# Patient Record
Sex: FEMALE | Race: WHITE | NOT HISPANIC OR LATINO | Employment: PART TIME
[De-identification: names, ages, dates, MRNs, and addresses within clinical notes are randomized per-mention and may not be internally consistent; named-entity substitution may affect disease eponyms.]

---

## 2017-03-02 ENCOUNTER — CHARTING TRANS (OUTPATIENT)
Dept: OTHER | Age: 52
End: 2017-03-02

## 2019-07-15 ENCOUNTER — HOSPITAL (OUTPATIENT)
Dept: OTHER | Age: 54
End: 2019-07-15

## 2019-07-16 LAB — PATHOLOGIST NAME: NORMAL

## 2019-09-11 ENCOUNTER — HOSPITAL (OUTPATIENT)
Dept: OTHER | Age: 54
End: 2019-09-11
Attending: INTERNAL MEDICINE

## 2021-10-20 ENCOUNTER — OFFICE VISIT (OUTPATIENT)
Dept: DERMATOLOGY | Age: 56
End: 2021-10-20

## 2021-10-20 DIAGNOSIS — L30.9 HAND DERMATITIS: Primary | ICD-10-CM

## 2021-10-20 PROCEDURE — 99203 OFFICE O/P NEW LOW 30 MIN: CPT | Performed by: DERMATOLOGY

## 2021-10-20 RX ORDER — AMLODIPINE BESYLATE 2.5 MG/1
2.5 TABLET ORAL DAILY
COMMUNITY
Start: 2021-09-22

## 2024-09-01 ENCOUNTER — HOSPITAL ENCOUNTER (OUTPATIENT)
Age: 59
Discharge: HOME | End: 2024-09-01
Payer: COMMERCIAL

## 2024-09-01 VITALS
SYSTOLIC BLOOD PRESSURE: 169 MMHG | BODY MASS INDEX: 26.46 KG/M2 | DIASTOLIC BLOOD PRESSURE: 91 MMHG | TEMPERATURE: 98 F | WEIGHT: 155 LBS | RESPIRATION RATE: 18 BRPM | OXYGEN SATURATION: 99 % | HEIGHT: 64 IN | HEART RATE: 60 BPM

## 2024-09-01 DIAGNOSIS — H57.89 EYE REDNESS: Primary | ICD-10-CM

## 2024-09-01 PROCEDURE — 99203 OFFICE O/P NEW LOW 30 MIN: CPT

## 2024-09-01 RX ORDER — TETRACAINE HYDROCHLORIDE 5 MG/ML
1 SOLUTION OPHTHALMIC ONCE
Status: COMPLETED | OUTPATIENT
Start: 2024-09-01 | End: 2024-09-01

## 2024-09-01 RX ORDER — OFLOXACIN 3 MG/ML
1 SOLUTION/ DROPS OPHTHALMIC 4 TIMES DAILY
Qty: 1 EACH | Refills: 0 | Status: SHIPPED | OUTPATIENT
Start: 2024-09-01 | End: 2024-09-08

## 2024-09-01 RX ORDER — AMLODIPINE BESYLATE 2.5 MG/1
5 TABLET ORAL DAILY
COMMUNITY
Start: 2024-07-02

## 2025-01-16 ENCOUNTER — OFFICE VISIT (OUTPATIENT)
Dept: FAMILY MEDICINE CLINIC | Facility: CLINIC | Age: 60
End: 2025-01-16
Payer: COMMERCIAL

## 2025-01-16 VITALS
SYSTOLIC BLOOD PRESSURE: 136 MMHG | DIASTOLIC BLOOD PRESSURE: 76 MMHG | OXYGEN SATURATION: 95 % | WEIGHT: 159 LBS | HEART RATE: 88 BPM | RESPIRATION RATE: 14 BRPM | TEMPERATURE: 98 F | BODY MASS INDEX: 27.14 KG/M2 | HEIGHT: 64 IN

## 2025-01-16 DIAGNOSIS — Z12.4 SCREENING FOR CERVICAL CANCER: ICD-10-CM

## 2025-01-16 DIAGNOSIS — M25.552 CHRONIC LEFT HIP PAIN: ICD-10-CM

## 2025-01-16 DIAGNOSIS — M25.561 CHRONIC PAIN OF BOTH KNEES: ICD-10-CM

## 2025-01-16 DIAGNOSIS — Z12.11 SCREENING FOR COLON CANCER: ICD-10-CM

## 2025-01-16 DIAGNOSIS — M25.562 CHRONIC PAIN OF BOTH KNEES: ICD-10-CM

## 2025-01-16 DIAGNOSIS — G89.29 CHRONIC PAIN OF BOTH KNEES: ICD-10-CM

## 2025-01-16 DIAGNOSIS — G89.29 CHRONIC LEFT HIP PAIN: ICD-10-CM

## 2025-01-16 DIAGNOSIS — Z00.00 ROUTINE GENERAL MEDICAL EXAMINATION AT A HEALTH CARE FACILITY: Primary | ICD-10-CM

## 2025-01-16 DIAGNOSIS — I10 PRIMARY HYPERTENSION: ICD-10-CM

## 2025-01-16 RX ORDER — AMLODIPINE BESYLATE 2.5 MG/1
5 TABLET ORAL DAILY
Qty: 180 TABLET | Refills: 3 | Status: SHIPPED | OUTPATIENT
Start: 2025-01-16

## 2025-01-16 NOTE — PROGRESS NOTES
HPI:    Josefina Zamora is a 59 year old female who presents for Physical (NP- establishing with you. Needs BP meds she is out. Req to see Ausra for paps will make appt. /The following individual(s) verbally consented to be recorded using ambient AI listening technology and understand that they can each withdraw their consent to this listening technology at any point by asking the clinician to turn off or pause the recording: /Reviewed Preventative/Wellness form with patient.//)     History of Present Illness  The patient, with a history of hypertension and colon polyps, presents for a routine check-up. She reports being generally healthy and is currently on amlodipine for blood pressure control. She takes two 2.5mg tablets of amlodipine daily, which she believes motivates her to work on weight reduction with the goal of reducing the medication dose. She has not checked her blood pressure recently and does not own a home blood pressure monitor.    The patient also reports a history of colon polyps and is due for a colonoscopy. She requests a referral for the procedure. Additionally, she requests a referral for a mammogram, as she has not had one recently.    The patient also reports experiencing pain in both knees and the left hip, which started when she turned [age]. She requests a referral to an orthopedist for further evaluation. She denies a confirmed diagnosis of arthritis.    The patient also mentions a recent episode of sharp eye pain, which she believes may have been caused by exposure to cleaning fumes. This episode led to an immediate care visit, during which she had elevated blood pressure.       Past History:   She  has a past medical history of Essential hypertension.   She  has a past surgical history that includes colonoscopy; d & c ();  (2002); other surgical history (); and cholecystectomy (2010).   Her family history includes Hypertension in her father.   She  reports current  alcohol use. She reports that she does not currently use drugs after having used the following drugs: Cannabis. No history on file for tobacco use.     She is not on any long-term medications.   She is allergic to cefadroxil and heparin.     Current Outpatient Medications on File Prior to Visit   Medication Sig    amLODIPine 2.5 MG Oral Tab Take 2 tablets (5 mg total) by mouth daily.     No current facility-administered medications on file prior to visit.         REVIEW OF SYSTEMS:   Patient denies shortness of breath, denies chest pain and denies any recent fevers or chills.    Patient reports no urinary complaints and denies headaches or visual disturbances.   Patient denies any abdominal pain at this time. Patient has no new skin lesions.  Patient reports no acute back pain and reports no dizziness or headaches.   Patient reports no visual disturbances and reports hearing has been about the same.   Patient reports no recent injury or trauma.               EXAM:    /76   Pulse 88   Temp 97.9 °F (36.6 °C)   Resp 14   Ht 5' 4\" (1.626 m)   Wt 159 lb (72.1 kg)   SpO2 95%   BMI 27.29 kg/m²  Estimated body mass index is 27.29 kg/m² as calculated from the following:    Height as of this encounter: 5' 4\" (1.626 m).    Weight as of this encounter: 159 lb (72.1 kg).    General Appearance:  Alert, cooperative, no distress, appears stated age   Head:  Normocephalic, without obvious abnormality, atraumatic   Eyes:  conjunctiva/cornea is not erythematous.        Nose: No nasal drainage.    Throat: No erythema    Neck: Supple, symmetrical, trachea midline, and normal ROM  thyroid: no obvious nodules   Back:   Symmetric, no curvature, ROM normal, no CVA tenderness   Lungs:   Clear to auscultation bilaterally, respirations unlabored   Chest Wall:  No tenderness or deformity   Heart:  Regular rate and rhythm, S1, S2 normal, no murmur,   Abdomen:   Soft, non-tender, bowel sounds active. No hernia.    Genitalia:      Rectal:     Extremities: Extremities normal, atraumatic, no cyanosis or edema   Pulses: 2+ and symmetric   Skin: Skin color, texture, turgor normal, no new rashes    Lymph nodes: No obvious cervical adenopathy.    Neurologic and psych: Normal speech, Alert and oriented x 3.   Normal mood, normal insight and judgment.                    ASSESSMENT AND PLAN:   1. Routine general medical examination at a health care facility    - [facility] SHELIA 2D+3D SCREENING BILAT (CPT=77067/98296); Future  - CBC With Differential With Platelet; Future  - Comp Metabolic Panel (14); Future  - Lipid Panel; Future  - TSH W Reflex To Free T4; Future    2. Screening for colon cancer  -due for c-scope, history of colon polyps.   - Gastro Referral - In Network    3. Primary hypertension  - 2.5 mg q daily when BP is at 130/80 and if over 140/90 should take 5 mg q daily.   - amLODIPine 2.5 MG Oral Tab; Take 2 tablets (5 mg total) by mouth daily.  Dispense: 180 tablet; Refill: 3    4. Screening for cervical cancer  -has seen GYn on 2/10/23    5. Chronic left hip pain  -referral to Dr. Kenney  - Ortho Referral - In Network    6. Chronic pain of both knees  -referral to Dr. Kenney.   - Ortho Referral - In Network     Assessment & Plan  Hypertension  On Amlodipine 2.5mg twice daily, but inconsistent dosing. Discussed the importance of consistent dosing and the potential to decrease dose with weight loss and lower blood pressure readings.  -Continue Amlodipine 2.5mg twice daily.  -Consider monitoring blood pressure at home or local pharmacy to assess need for dose adjustment.    Joint Pain  Complaints of pain in left hip and both knees. No confirmed diagnosis of arthritis.  -Referral to orthopedist for further evaluation and management.    General Health Maintenance  -Order mammogram.  -Referral for colonoscopy due in 2024 due to history of colon polyps.  -Plan to check routine labs after May 1, 2025.       Mina Phillips MD, 1/16/2025, 2:24 PM     Note to  patient: The 21st Century Cures Act makes medical notes like these available to patients in the interest of transparency. However, this is a medical document intended as peer to peer communication. It is written in medical language and may contain abbreviations or verbiage that are unfamiliar. It may appear blunt or direct. Medical documents are intended to carry relevant information, facts as evident, and the clinical opinion of the practitioner who signs the document.

## 2025-02-18 ENCOUNTER — TELEPHONE (OUTPATIENT)
Dept: ORTHOPEDICS CLINIC | Facility: CLINIC | Age: 60
End: 2025-02-18

## 2025-02-18 DIAGNOSIS — M25.552 PAIN OF LEFT HIP: ICD-10-CM

## 2025-02-18 DIAGNOSIS — M25.562 PAIN IN BOTH KNEES, UNSPECIFIED CHRONICITY: Primary | ICD-10-CM

## 2025-02-18 DIAGNOSIS — M25.561 PAIN IN BOTH KNEES, UNSPECIFIED CHRONICITY: Primary | ICD-10-CM

## 2025-02-18 NOTE — TELEPHONE ENCOUNTER
XRs ordered and scheduled per Ortho protocol.     Called and left message on voicemail to inform them of Xrs and asked them to arrive 15-20 minutes prior to appointment with Dr. Kenney

## 2025-02-20 ENCOUNTER — OFFICE VISIT (OUTPATIENT)
Facility: CLINIC | Age: 60
End: 2025-02-20
Payer: COMMERCIAL

## 2025-02-20 ENCOUNTER — HOSPITAL ENCOUNTER (OUTPATIENT)
Dept: GENERAL RADIOLOGY | Age: 60
Discharge: HOME | End: 2025-02-20
Attending: STUDENT IN AN ORGANIZED HEALTH CARE EDUCATION/TRAINING PROGRAM
Payer: COMMERCIAL

## 2025-02-20 VITALS — WEIGHT: 155 LBS | BODY MASS INDEX: 26.46 KG/M2 | HEIGHT: 64 IN

## 2025-02-20 DIAGNOSIS — M25.561 PAIN IN BOTH KNEES, UNSPECIFIED CHRONICITY: ICD-10-CM

## 2025-02-20 DIAGNOSIS — M25.562 PAIN IN BOTH KNEES, UNSPECIFIED CHRONICITY: ICD-10-CM

## 2025-02-20 DIAGNOSIS — M17.11 PRIMARY OSTEOARTHRITIS OF RIGHT KNEE: Primary | ICD-10-CM

## 2025-02-20 DIAGNOSIS — M25.552 PAIN OF LEFT HIP: ICD-10-CM

## 2025-02-20 DIAGNOSIS — M17.12 PRIMARY OSTEOARTHRITIS OF LEFT KNEE: ICD-10-CM

## 2025-02-20 DIAGNOSIS — M70.62 TROCHANTERIC BURSITIS OF LEFT HIP: ICD-10-CM

## 2025-02-20 PROCEDURE — 73564 X-RAY EXAM KNEE 4 OR MORE: CPT | Performed by: STUDENT IN AN ORGANIZED HEALTH CARE EDUCATION/TRAINING PROGRAM

## 2025-02-20 PROCEDURE — 99204 OFFICE O/P NEW MOD 45 MIN: CPT | Performed by: STUDENT IN AN ORGANIZED HEALTH CARE EDUCATION/TRAINING PROGRAM

## 2025-02-20 PROCEDURE — 73502 X-RAY EXAM HIP UNI 2-3 VIEWS: CPT | Performed by: STUDENT IN AN ORGANIZED HEALTH CARE EDUCATION/TRAINING PROGRAM

## 2025-02-20 PROCEDURE — 3008F BODY MASS INDEX DOCD: CPT | Performed by: STUDENT IN AN ORGANIZED HEALTH CARE EDUCATION/TRAINING PROGRAM

## 2025-02-20 NOTE — PROGRESS NOTES
Orthopaedic Surgery  [address] [address] [address]   [phone]     Chief Complaint:  Left Hip Pain, Bilateral Knee Pain    History of Present Illness:   Josefina Zamora is a 59 year old female seen in clinic today as new for evaluation of their left hip and bilateral knees. Hip pain has been present since 2024. Knee pain has been present for several years. Hip pain is located mostly at the lateral hip but sometimes can involve the posterior hip or groin. Knee pain is anterior, not medially or laterally based. It is described as aching. Symptoms are exacerbated by stairs. Hip pain can be worse with flexion and some exercises in yoga. Knee pain is made worse with kneeling. No swelling of the knee that they have noticed. No mechanical complaints. Their symptoms are made better by rest. No prior surgeries to the hip or either knee.    Prior imaging studies have included none.   Treatment so far has consisted of conservative management including NSAIDs at night as needed.    Assistive devices used: none      PMH/PSH/Family History/Social History/Meds/Allergies:   Past Medical History:    Essential hypertension       Past Surgical History:   Procedure Laterality Date    Cholecystectomy  2010    Colonoscopy      D & c  2001    Miscarried      2002    Other surgical history  1966    Corrective eye surgery       Family History   Problem Relation Age of Onset    Hypertension Father        Social History     Socioeconomic History    Marital status:      Spouse name: Not on file    Number of children: Not on file    Years of education: Not on file    Highest education level: Not on file   Occupational History    Not on file   Tobacco Use    Smoking status: Former     Current packs/day: 0.00     Average packs/day: 0.5 packs/day for 15.0 years (7.5 ttl pk-yrs)     Types: Cigarettes     Quit date: 2000     Years since quittin.1    Smokeless tobacco: Not on file   Vaping Use     Vaping status: Never Used   Substance and Sexual Activity    Alcohol use: Yes    Drug use: Never    Sexual activity: Not on file   Other Topics Concern    Caffeine Concern No    Exercise No    Seat Belt No    Special Diet No    Stress Concern No    Weight Concern Yes     Comment: Would like to loose weight   Social History Narrative    Not on file     Social Drivers of Health     Food Insecurity: No Food Insecurity (1/16/2025)    NCSS - Food Insecurity     Worried About Running Out of Food in the Last Year: No     Ran Out of Food in the Last Year: No   Transportation Needs: No Transportation Needs (1/16/2025)    NCSS - Transportation     Lack of Transportation: No   Stress: Not on file   Housing Stability: Not At Risk (1/16/2025)    NCSS - Housing/Utilities     Has Housing: Yes     Worried About Losing Housing: No     Unable to Get Utilities: No        Current Outpatient Medications   Medication Instructions    amLODIPine (NORVASC) 5 mg, Oral, Daily    amLODIPine (NORVASC) 5 mg, Oral, Daily       Allergies[1]      Physical Exam:   Vitals:    02/20/25 1040   Weight: 155 lb (70.3 kg)   Height: 5' 4\" (1.626 m)     Estimated body mass index is 26.61 kg/m² as calculated from the following:    Height as of this encounter: 5' 4\" (1.626 m).    Weight as of this encounter: 155 lb (70.3 kg).    Constitutional: No acute distress, well nourished.  Eyes: Anicteric sclera, pink conjunctiva  Ears, Nose, Mouth and Throat: Normocephalic, atraumatic, moist mucous membranes  Cardiovascular: No pitting edema or varicosities in the lower extremities  Respiratory: No respiratory distress, normal respiratory rhythm and effort   Neurological:  Oriented to person, place, and time.  Psychological:  Appropriate mood and affect.      Comprehensive Left Hip Exam:      Gait: Normal  Inspection: No obvious limb length discrepancy  Palpation: Trochanteric bursa; non-tender IT band  ROM: Flexion: 120 degrees  Internal Rotation: 20  degrees  External Rotation: 40 degrees  ROM causes pain?: Some lateral hip pain with rotation  Strength: Intact 5/5 strength with IP, Quadriceps, TA, GS, and EHL  Sensation: Grossly intact to light touch over SPN/DPN/Tibial/Sural nerve distributions  Special tests: Stinchfield: not painful; Trendelenberg: Negative  Vasc: Warm perfused extremity    Bilateral Knee Exam  Inspection: No erythema, ecchymoses, or wounds. No rash. No previous incisions noted. No effusion  Alignment: neutral  ROM: 0 - 130 degrees, flexion contracture: 0 degrees, quad lag: no  Stability: A/P stress: stable, firm endpoint, M/L stress: stable, firm endpoint  Pain or crepitus with ROM?: No. No pain with patellar grind  Non-tender at: medial joint line, lateral joint line, patella, patellar tendon, quadriceps tendon      Imaging:   XR AP Pelvis and 2 views AP and Lat of the Left Hip were personally reviewed and interpreted    There are mild degenerative changes of the hip with mild loss of joint space, subchondral sclerosis, and early osteophyte formation  No fracture or dislocation noted    Weightbearing XRs of the bilateral knee were obtained with AP, PA Flex, sunrise, and lateral views    They show mild to moderate degenerative changes of the knee, most pronounced in the medial and patellofemoral compartments with joint space narrowing and osteophyte formation, right greater than left.   No fracture or dislocation seen        Assessment:     ICD-10-CM    1. Primary osteoarthritis of right knee  M17.11       2. Primary osteoarthritis of left knee  M17.12       3. Trochanteric bursitis of left hip  M70.62              Plan:   At today's visit I discussed with the patient their diagnosis and the factors considered to form this diagnosis. Their history and physical exam and radiographic findings are consistent with arthritis of the knee and trochanteric bursa of the left hip. As we discussed their diagnosis, information was provided regarding the  underlying pathology.    During our discussion, we detailed treatment options available. Major joint arthritis is usually a chronic condition that can be effectively managed with conservative modalities. Trochanteric bursa will usually also improve with conservative management.    Conservative measures include activity modification, home exercise programs, physical therapy, oral anti-inflammatories and acetaminophen, and injections.     In consideration of prior treatments, I have recommended continued conservative treatment with Tylenol / NSAIDs and physical therapy.     Follow up if no improvement in 6-8 weeks and we can consider corticosteroid injections to the trochanteric bursa and/or knees.      Thank you very much for allowing me to participate in the care of this patient. If you have any questions, please do not hesitate to contact me.      Raymond Kenney MD  Adult Hip and Knee Reconstruction    Department of Orthopaedic Surgery  [facility]     [address] [place] [id number]  [address] [place] [id number]     t: [phone]  f: [phone]       [email or web address]         [1]   Allergies  Allergen Reactions    Cefadroxil HIVES and RASH    Heparin RASH

## 2025-02-21 ENCOUNTER — HOSPITAL ENCOUNTER (OUTPATIENT)
Dept: MAMMOGRAPHY | Age: 60
Discharge: HOME | End: 2025-02-21
Attending: FAMILY MEDICINE
Payer: COMMERCIAL

## 2025-02-21 DIAGNOSIS — Z00.00 ROUTINE GENERAL MEDICAL EXAMINATION AT A HEALTH CARE FACILITY: ICD-10-CM

## 2025-02-21 PROCEDURE — 77067 SCR MAMMO BI INCL CAD: CPT | Performed by: FAMILY MEDICINE

## 2025-02-21 PROCEDURE — 77063 BREAST TOMOSYNTHESIS BI: CPT | Performed by: FAMILY MEDICINE

## 2025-03-25 ENCOUNTER — TELEPHONE (OUTPATIENT)
Dept: PHYSICAL THERAPY | Facility: HOSPITAL | Age: 60
End: 2025-03-25

## 2025-03-26 ENCOUNTER — OFFICE VISIT (OUTPATIENT)
Facility: LOCATION | Age: 60
End: 2025-03-26
Attending: STUDENT IN AN ORGANIZED HEALTH CARE EDUCATION/TRAINING PROGRAM
Payer: COMMERCIAL

## 2025-03-26 DIAGNOSIS — M70.62 TROCHANTERIC BURSITIS OF LEFT HIP: ICD-10-CM

## 2025-03-26 DIAGNOSIS — M17.12 PRIMARY OSTEOARTHRITIS OF LEFT KNEE: ICD-10-CM

## 2025-03-26 DIAGNOSIS — M17.11 PRIMARY OSTEOARTHRITIS OF RIGHT KNEE: Primary | ICD-10-CM

## 2025-03-26 PROCEDURE — 97140 MANUAL THERAPY 1/> REGIONS: CPT

## 2025-03-26 PROCEDURE — 97110 THERAPEUTIC EXERCISES: CPT

## 2025-03-26 PROCEDURE — 97161 PT EVAL LOW COMPLEX 20 MIN: CPT

## 2025-03-26 NOTE — PROGRESS NOTES
LOWER EXTREMITY EVALUATION:     Diagnosis:   Primary osteoarthritis of right knee (M17.11)  Primary osteoarthritis of left knee (M17.12)  Trochanteric bursitis of left hip (M70.62) Patient:  Josefina Zamora (59 year old, female)        Referring Provider: Raymond Kenney  Today's Date   3/26/2025    Precautions:  None   Date of Evaluation: 25  Next MD visit: No data recorded  Date of Surgery: N/A     PATIENT SUMMARY   Summary of chief complaints: (L) hip/knee & (R) knee pain  History of current condition: (L) hip pain for about 9 months that has progressively worsened. (B) knee pain for many years. No previous therapy for the knees or hip in the past. Denies any major joint surgeries, denies any LE paresthesia, denies any significant back pain. (L) hip and bilateral knee pain with ascending/descending steps, squatting, kneeling, dressing/bathing, household activities, transitions. Has not used any ice or heat for relief. Pain in the (L) hip can be sharp and usually localized laterally and knee pain localized anteriorly. Denies any instability in the knees   Pain level: current 0 /10, at best 0 /10, at worst 7 /10  Description of symptoms: intermittent sharp lateral (L) hip pain (otherwise dull and achy); anterior knee pain   Occupation: [employer]    Leisure activities/Hobbies: walking 3-5 days/week (1-2 miles)   Prior level of function: no pain in the hip; painful and restricted with activity in regards to knees  Current limitations: walking long periods of time, standing, steps, squatting down, transitions, performing ADLs and household chores/activities  Pt goals: decrease pain, improve ability to perform daily activities without pain  Past medical history was reviewed with Josefina.   Imaging/Tests: see chart   Josefina  has a past medical history of Essential hypertension.  She  has a past surgical history that includes colonoscopy; d & c ();  (2002); other surgical history  (1966); and cholecystectomy (01/2010).    ASSESSMENT  Josefina presents to physical therapy evaluation with primary c/o (L) hip/knee & (R) knee pain. The results of the objective tests and measures show decreased & painful (L) hip ROM, decreased LE flexibility, decreased LE strength, gait impairment, hip mobility restrictions. Functional deficits include but are not limited to walking long periods of time, standing, steps, squatting down, transitions, performing ADLs and household chores/activities. Signs and symptoms are consistent with diagnosis of Primary osteoarthritis of right knee (M17.11)  Primary osteoarthritis of left knee (M17.12)  Trochanteric bursitis of left hip (M70.62). Pt and PT discussed evaluation findings, pathology, POC and HEP.  Pt voiced understanding and performs HEP correctly without reported pain. Skilled Physical Therapy is medically necessary to address the above impairments and reach functional goals.    OBJECTIVE:    Musculoskeletal  Observation: stands equal weight bearing, no obvious leg length discrepancy    Palpation: Moderate tenderness noted lateral quad, gluteal musculaure, TFL & greater trochanter. Myofascial restrictions and tightness noted throughout these structures      Special Tests: (-) scour, (-) FADIR    ROM and Strength: (* denotes performed with pain)  Hip   ROM MMT (-/5)    R L R L     Flex (L2) 110 degrees 105 degrees (painful) 5/5 4/5 (painful)     Ext      4-/5 3+/5 (painful)     Abd     4-/5 3+/5 (painful)     ER 30 degrees 25 degrees 5/5 4/5     IR 40 degrees 40 degrees 5/5 5/5    ,   Knee   ROM MMT (-/5)    R L R L     Flex 140 degrees 140 degrees 5/5 5/5     Ext (L3) 0 degrees 0 degrees 5/5 4/5 (painful)       Flexibility:  LE Flexibility R L     Hip Flexor min restricted mod restricted     Hamstrings mod restricted mod restricted     ITB min restricted mod restricted     Piriformis min restricted mod restricted     Quads min restricted mod restricted      Gastroc-soleus           Neurological:  Sensation: intact     Balance and Functional Mobility:  Gait: pt ambulates on level ground with mild antalgia, mild trendelenburg      Today's Treatment and Response:   Pt education was provided on exam findings, treatment diagnosis, treatment plan, expectations, and prognosis.  Today's Treatment       3/26/2025   LE Treatment   Therapeutic Exercise H/L HS stretch 30 sec x 3  H/L piriformis stretch 30 sec x 3 (knee to shoulder & knee down)  S/L clam 2 x 10    Manual Therapy STM/MFR (massage stick) (L) lateral quad, ITB, gluteal musculature    Therapeutic Exercise Minutes 10   Manual Therapy Minutes 10   Evaluation Minutes 25   Total Time Of Timed Procedures 20   Total Time Of Service-Based Procedures 25   Total Treatment Time 45   HEP H/L HS stretch 30 sec x 3  H/L piriformis stretch 30 sec x 3 (knee to shoulder & knee down)  S/L clam 2 x 10         Patient was instructed in and issued a HEP for:   H/L HS stretch 30 sec x 3  H/L piriformis stretch 30 sec x 3 (knee to shoulder & knee down)  S/L clam 2 x 10     Charges:  PT EVAL: Low Complexity, TherEx 1, Manual 1  In agreement with evaluation findings and clinical rationale, this evaluation involved LOW COMPLEXITY decision making due to no personal factors/comorbidities, 1-2 body structures involved/activity limitations, and stable symptoms as documented in the evaluation.                                                                                                                PLAN OF CARE:    Goals: (to be met in 10 visits)   Pt will report 50% reduction in both (L) hip & (B) knee pain rated at worst   Pt will be able to perform AROM (L) hip flexion to 110 degrees with minimal to no pain or restriction   Pt will demonstrate at least 4/5 LE strength in order to maximize effectiveness and efficiency with functional activities such as walking, steps, squatting down  Pt will verbalize being able to ascend/descend 6 & 8 inch  step with minimal to no (L) hip pain  Pt will verbalize being able to perform ADLs and household chores/tasks with minimal to no pain  Pt will be able to transition from sit to stand and get into and out of car with no pain or restrictions  Pt will be able to participate in most yoga exercises with minimal to no pain   Pt will perform HEP independently in order to sustain changes made with therapy sessions      Frequency / Duration: Patient will be seen 2x/week or a total of 10  visits over a 90 day period. Treatment will include: Gait training; Manual Therapy; Neuromuscular Re-education; Therapeutic Activities; Therapeutic Exercise; Home Exercise Program instruction    Education or treatment limitation: None   Rehab Potential: good     LEFS Score  LEFS Score: (Patient-Rptd) 71.25 % (3/23/2025  7:17 PM)      Patient/Family/Caregiver was advised of these findings, precautions, and treatment options and has agreed to actively participate in planning and for this course of care.    Thank you for your referral. Please co-sign or sign and return this letter via fax as soon as possible to [phone]. If you have any questions, please contact me at Dept: [phone]    Sincerely,  Electronically signed by therapist: Alan Robertson, PT  Physician's certification required: Yes  I certify the need for these services furnished under this plan of treatment and while under my care.    X___________________________________________________ Date____________________    Certification From: 3/26/2025  To:6/24/2025

## 2025-03-28 ENCOUNTER — HOSPITAL ENCOUNTER (OUTPATIENT)
Dept: MAMMOGRAPHY | Age: 60
Discharge: HOME | End: 2025-03-28
Attending: FAMILY MEDICINE
Payer: COMMERCIAL

## 2025-03-28 DIAGNOSIS — R92.2 INCONCLUSIVE MAMMOGRAM: ICD-10-CM

## 2025-03-28 PROCEDURE — 77061 BREAST TOMOSYNTHESIS UNI: CPT | Performed by: FAMILY MEDICINE

## 2025-03-28 PROCEDURE — 77065 DX MAMMO INCL CAD UNI: CPT | Performed by: FAMILY MEDICINE

## 2025-03-28 NOTE — IMAGING NOTE
This Breast Care RN assisted Dr. Stanley with recommendation for a left breast 1 site stereotactic biopsy for calcifications.  Procedure reviewed and all questions answered. Emotional and educational support given. On the day of the biopsy, pt instructed to take Tylenol 1000mg PO, eat a light meal & bring or wear a sports bra.  Post biopsy care also reviewed with pt to include NO lifting more than 5lbs, no exercising or housework (limit upper body movement) for 24-48 hrs post biopsy.  Patient denies blood thinners, bleeding disorders, liver disease, and chemo. Pt verbalized understanding.  Our breast center schedulers will be calling to schedule an appt that is convenient for pt.

## 2025-04-02 ENCOUNTER — OFFICE VISIT (OUTPATIENT)
Facility: LOCATION | Age: 60
End: 2025-04-02
Attending: STUDENT IN AN ORGANIZED HEALTH CARE EDUCATION/TRAINING PROGRAM
Payer: COMMERCIAL

## 2025-04-02 PROCEDURE — 97110 THERAPEUTIC EXERCISES: CPT

## 2025-04-02 PROCEDURE — 97140 MANUAL THERAPY 1/> REGIONS: CPT

## 2025-04-02 NOTE — PROGRESS NOTES
Patient: Josefina Zamora (59 year old, female) Referring Provider:  Insurance:   Diagnosis: Primary osteoarthritis of right knee (M17.11)  Primary osteoarthritis of left knee (M17.12)  Trochanteric bursitis of left hip (M70.62) Raymond Kenney  [employer]   Date of Surgery: N/A Next MD visit:  N/A   Precautions:  None No data recorded Referral Information:    Date of Evaluation: Req: 0, Auth: 0, Exp:     03/26/25 POC Auth Visits:  10       Today's Date   4/2/2025    Subjective  (L) hip feels better than last session.  worked on her thigh a little doing massage work. Knees a bit painful today. Both hip and knees hurt with ascending/descending stairs       Pain: 4/10     Objective  Myofascial restrictions (L) lateral quad; Moderate tenderness/guarding (L) TFL/ITB, gluteal musculature        Assessment  Demonstrates less palpable tenderness throughout (L) hip//knee musculature from the initial evaluation. Guarding and tightness noted mid to proximal lateral quad, TFL/ITB, posterolateral hip musculature. Less pain with ambulation post treatment. Tolerated exercises fairly well with only minimal discomfort    Goals (to be met in 10 visits)   Pt will report 50% reduction in both (L) hip & (B) knee pain rated at worst   Pt will be able to perform AROM (L) hip flexion to 110 degrees with minimal to no pain or restriction   Pt will demonstrate at least 4/5 LE strength in order to maximize effectiveness and efficiency with functional activities such as walking, steps, squatting down  Pt will verbalize being able to ascend/descend 6 & 8 inch step with minimal to no (L) hip pain  Pt will verbalize being able to perform ADLs and household chores/tasks with minimal to no pain  Pt will be able to transition from sit to stand and get into and out of car with no pain or restrictions  Pt will be able to participate in most yoga exercises with minimal to no pain   Pt will perform HEP independently in order to sustain changes made with  therapy sessions          Plan  Continue PT 2x/week to address (L) hip soft tissue restrictions, progress hip/knee strengthening & flexibility exercises. Attempt shuttle/leg press next visit    Treatment Last 4 Visits  Treatment Day: 2       3/26/2025 4/2/2025   LE Treatment   Therapeutic Exercise H/L HS stretch 30 sec x 3  H/L piriformis stretch 30 sec x 3 (knee to shoulder & knee down)  S/L clam 2 x 10  NuStep x 8 min (L4)   PT assisted (L) ITB stretch in supine 30 sec x 3  H/L HS stretch 30 sec x 3  H/L piriformis stretch 30 sec x 3 (knee to opposite shoulder & push knee down)  H/L ITB stretch 30 sec x 2  H/L clam GTB 2 x 15  Bridge 2 x 10 (5 second hold)   SB calf stretch 30 sec x 3  Standing ITB stretch 30 sec (had difficult time with finding stretch)     Manual Therapy STM/MFR (massage stick) (L) lateral quad, ITB, gluteal musculature  STM/massage stick to the (L) mid to proximal lateral quad, TFL/ITB, posterolateral hip musculature    Therapeutic Exercise Minutes 10 24   Manual Therapy Minutes 10 23   Evaluation Minutes 25    Total Time Of Timed Procedures 20 47   Total Time Of Service-Based Procedures 25 0   Total Treatment Time 45 47   HEP H/L HS stretch 30 sec x 3  H/L piriformis stretch 30 sec x 3 (knee to shoulder & knee down)  S/L clam 2 x 10          HEP  Access [email or web address]: [email or web address]  URL: [email or web address]  Date: 04/02/2025  Prepared by: Alan Robertson    Exercises  - Supine Bridge  - 1-2 x daily - 7 x weekly - 2 sets - 10 reps - 5 second hold  - Hooklying Clamshell with Resistance  - 1-2 x daily - 7 x weekly - 2 sets - 15 reps  - Clamshell  - 1-2 x daily - 7 x weekly - 2 sets - 10 reps  - Supine Hamstring Stretch  - 1-2 x daily - 7 x weekly - 3 sets - 30 seconds hold  - Supine Piriformis Stretch with Foot on Ground  - 1-2 x daily - 7 x weekly - 3 sets - 30 seconds hold  - Supine Figure 4 Piriformis Stretch  - 1 x daily - 7 x weekly - 3 sets - 30 seconds hold  - Supine ITB Stretch  -  1 x daily - 7 x weekly - 3 sets - 30 seconds hold  - ITB Stretch at Wall  - 1 x daily - 7 x weekly - 3 sets - 30 seconds hold    Charges     TherEx 2, Manual 2

## 2025-04-04 ENCOUNTER — OFFICE VISIT (OUTPATIENT)
Facility: LOCATION | Age: 60
End: 2025-04-04
Attending: STUDENT IN AN ORGANIZED HEALTH CARE EDUCATION/TRAINING PROGRAM
Payer: COMMERCIAL

## 2025-04-04 PROCEDURE — 97140 MANUAL THERAPY 1/> REGIONS: CPT

## 2025-04-04 PROCEDURE — 97110 THERAPEUTIC EXERCISES: CPT

## 2025-04-04 NOTE — PROGRESS NOTES
Patient: Josefina Zamora (59 year old, female) Referring Provider:  Insurance:   Diagnosis: Primary osteoarthritis of right knee (M17.11)  Primary osteoarthritis of left knee (M17.12)  Trochanteric bursitis of left hip (M70.62) Raymond Kenney  [employer]   Date of Surgery: N/A Next MD visit:  N/A   Precautions:  None No data recorded Referral Information:    Date of Evaluation: Req: 0, Auth: 0, Exp:     03/26/25 POC Auth Visits:  10       Today's Date   4/4/2025    Subjective  Hip is a bit sore after walking 1 mile with her son. Clam exercise on side is a bit painful as well. Does see some improvements since starting       Pain: 3/10     Objective  Moderate tenderness (L) TFL, quad, posterolateral hip musculature          Assessment  Tolerated treatment session well. Less irritability with transitions during session. Focused more on strengthening today as opposed to stretching. Discussed doing clam shell on back with resistance band instead of on side.    Goals (to be met in 10 visits)   Pt will report 50% reduction in both (L) hip & (B) knee pain rated at worst   Pt will be able to perform AROM (L) hip flexion to 110 degrees with minimal to no pain or restriction   Pt will demonstrate at least 4/5 LE strength in order to maximize effectiveness and efficiency with functional activities such as walking, steps, squatting down  Pt will verbalize being able to ascend/descend 6 & 8 inch step with minimal to no (L) hip pain  Pt will verbalize being able to perform ADLs and household chores/tasks with minimal to no pain  Pt will be able to transition from sit to stand and get into and out of car with no pain or restrictions  Pt will be able to participate in most yoga exercises with minimal to no pain   Pt will perform HEP independently in order to sustain changes made with therapy sessions              Plan  Continue PT 2x/week to work on improving muscular balance. Implement more strengthening exerises as tolerated    Treatment  Last 4 Visits  Treatment Day: 3       3/26/2025 4/2/2025 4/4/2025   LE Treatment   Therapeutic Exercise H/L HS stretch 30 sec x 3  H/L piriformis stretch 30 sec x 3 (knee to shoulder & knee down)  S/L clam 2 x 10  NuStep x 8 min (L4)   PT assisted (L) ITB stretch in supine 30 sec x 3  H/L HS stretch 30 sec x 3  H/L piriformis stretch 30 sec x 3 (knee to opposite shoulder & push knee down)  H/L ITB stretch 30 sec x 2  H/L clam GTB 2 x 15  Bridge 2 x 10 (5 second hold)   SB calf stretch 30 sec x 3  Standing ITB stretch 30 sec (had difficult time with finding stretch)   NuStep x 8 min (L6)  H/L HS stretch 30 sec x 3  H/L piriformis stretch 30 sec x 3  H/L clam GTB 2 x 15   Bridge 2 x 10 (5 second hold)   SB calf stretch 30 sec x 3  Shuttle (DL) 87# 2 x 15  HEP review    Manual Therapy STM/MFR (massage stick) (L) lateral quad, ITB, gluteal musculature  STM/massage stick to the (L) mid to proximal lateral quad, TFL/ITB, posterolateral hip musculature  STM/massage stick to lateral (R) quad, TFL/ITB, posterolateral hip musculature  STM/MFR (R) iliacus    Therapeutic Exercise Minutes 10 24 30   Manual Therapy Minutes 10 23 13   Evaluation Minutes 25     Total Time Of Timed Procedures 20 47 43   Total Time Of Service-Based Procedures 25 0 0   Total Treatment Time 45 47 43   HEP H/L HS stretch 30 sec x 3  H/L piriformis stretch 30 sec x 3 (knee to shoulder & knee down)  S/L clam 2 x 10           HEP  H/L HS stretch 30 sec x 3  H/L piriformis stretch 30 sec x 3 (knee to shoulder & knee down)  S/L clam 2 x 10     Charges     TherEx 2, Manual 1

## 2025-04-07 ENCOUNTER — TELEPHONE (OUTPATIENT)
Dept: PHYSICAL THERAPY | Facility: HOSPITAL | Age: 60
End: 2025-04-07

## 2025-04-09 NOTE — PROGRESS NOTES
Patient: Josefina Zamora (59 year old, female) Referring Provider:  Insurance:   Diagnosis: Primary osteoarthritis of right knee (M17.11)  Primary osteoarthritis of left knee (M17.12)  Trochanteric bursitis of left hip (M70.62) Raymond Kenney  [employer]   Date of Surgery: N/A Next MD visit:  N/A   Precautions:  None No data recorded Referral Information:    Date of Evaluation: Req: 0, Auth: 0, Exp:     03/26/25 POC Auth Visits:  10       Today's Date   4/9/2025    Subjective  Hip has been more sore lately. Has been sleeping on the (L) side which is irritating it.       Pain: 4/10     Objective  Moderate tightness & tenderness noted anterior/lateral/posterior (L) hip       Assessment  Previous to manual intervention, patient ambulated with moderate pain. Post intervention able to ambulate with moderate improvement in symptoms. Tightness noted anterior/lateral/posterior hip musculature. Tolerated treatment session well and generally felt better after session. Discussed avoiding laying on (L) side at night if possible and continue icing.    Goals (to be met in 10 visits)   Pt will report 50% reduction in both (L) hip & (B) knee pain rated at worst   Pt will be able to perform AROM (L) hip flexion to 110 degrees with minimal to no pain or restriction   Pt will demonstrate at least 4/5 LE strength in order to maximize effectiveness and efficiency with functional activities such as walking, steps, squatting down  Pt will verbalize being able to ascend/descend 6 & 8 inch step with minimal to no (L) hip pain  Pt will verbalize being able to perform ADLs and household chores/tasks with minimal to no pain  Pt will be able to transition from sit to stand and get into and out of car with no pain or restrictions  Pt will be able to participate in most yoga exercises with minimal to no pain   Pt will perform HEP independently in order to sustain changes made with therapy sessions              Plan  Continue PT 2x/week. Work on soft  tissue restrictions, gentle stretching, strengthening exercises    Treatment Last 4 Visits  Treatment Day: 4       3/26/2025 4/2/2025 4/4/2025 4/9/2025   LE Treatment   Therapeutic Exercise H/L HS stretch 30 sec x 3  H/L piriformis stretch 30 sec x 3 (knee to shoulder & knee down)  S/L clam 2 x 10  NuStep x 8 min (L4)   PT assisted (L) ITB stretch in supine 30 sec x 3  H/L HS stretch 30 sec x 3  H/L piriformis stretch 30 sec x 3 (knee to opposite shoulder & push knee down)  H/L ITB stretch 30 sec x 2  H/L clam GTB 2 x 15  Bridge 2 x 10 (5 second hold)   SB calf stretch 30 sec x 3  Standing ITB stretch 30 sec (had difficult time with finding stretch)   NuStep x 8 min (L6)  H/L HS stretch 30 sec x 3  H/L piriformis stretch 30 sec x 3  H/L clam GTB 2 x 15   Bridge 2 x 10 (5 second hold)   SB calf stretch 30 sec x 3  Shuttle (DL) 87# 2 x 15  HEP review  NuStep x 6 min   Prone RF stretch (PT assisted) 1 min x 2  H/L HS stretch 30 sec x 3  H/L piriformis stretch 30 sec x 3  Bridge 2 x 10 (5 second hold)  H/L clam GTB 2 x 15  SB calf stretch 30 sec x 3  Shuttle (DL) 100# 3 x 15   Manual Therapy STM/MFR (massage stick) (L) lateral quad, ITB, gluteal musculature  STM/massage stick to the (L) mid to proximal lateral quad, TFL/ITB, posterolateral hip musculature  STM/massage stick to lateral (R) quad, TFL/ITB, posterolateral hip musculature  STM/MFR (R) iliacus  STM/massage stick to (L) RF, lateral quad, ITB/TFL, posterior hip musculature    Therapeutic Exercise Minutes 10 24 30 30   Manual Therapy Minutes 10 23 13 15   Evaluation Minutes 25      Total Time Of Timed Procedures 20 47 43 45   Total Time Of Service-Based Procedures 25 0 0 0   Total Treatment Time 45 47 43 45               HEP  H/L HS stretch 30 sec x 3  H/L piriformis stretch 30 sec x 3 (knee to shoulder & knee down)  S/L clam 2 x 10     Charges     TherEx 2, Manual 2

## 2025-04-10 NOTE — DISCHARGE INSTRUCTIONS
Discharge Instructions  Stereotactic / Ultrasound / MRI Core Biopsy     Place an ice pack on top of the biopsy site in your bra OR the folds of the Ace wrap for 10-15 minutes every hour until bedtime for your comfort and to decrease bleeding.     Keep your supportive bra OR the Ace wrap in place for 24 hours after your biopsy. This compression decreases bleeding and breast movement for your comfort. Wear a supportive bra for the next couple days for comfort (as well as for sleeping)     No bath or shower during the first 24 hours after biopsy. After this time, you may take a bath or shower. It’s okay if the steri-strips get wet but don’t soak them.   No saunas, hot tubs, or swimming pools until the steri-strips fall off (5-7days).  This prevents infection and allows time for the area to completely close and heal.     DO NOT remove the steri-strips. They will fall off in 5 days to two weeks. If any type of irritation (redness, itching, OR blisters) develops in the area around the steri-strips, remove them gently. Keep the area clean and dry.     It is normal to have mild discomfort and bruising at the biopsy site.      You may take Tylenol as needed for discomfort.     DO NOT participate in strenuous activity (aerobics, heavy lifting, housework, gardening, etc.) 48 hours after your biopsy to prevent bleeding.     You will receive results typically within 2-3 business days. Occasionally, the specimen is sent off-site for a 2nd opinion. You will be notified when this occurs as this will delay your results.     If you have any questions about the procedure or your results, please contact the Breast Care Coordinator Nurse at [phone]. (M-F 7:30-4)    If you are having a MRI Breast Biopsy or an Ultrasound guided biopsy, you will be billed for the biopsy and a unilateral mammogram separately.    A 6-month or one year follow-up Diagnostic Mammogram/Ultrasound will be recommended to document stability of the biopsy  site. It may be scheduled at [facility] or [facility] by calling [phone]. You will need an order for this exam from your referring physician.       5/2024

## 2025-04-11 NOTE — TELEPHONE ENCOUNTER
Telephoned Josefina Zamora and name,  verified with patient. Notified Josefina Dean Zamora of left breast negative for malignancy but positive for ALH biopsy result. [facility]ance pending. Josefina Zamora reports biopsy site is healing well. Radiologist recommends surgical consultation. Called Dr Phillips's office and spoke to Dr Dupree. Patient is being referred to Dr Stern. Patient provided with the contact information for Dr Stern and instructed to call for a consultation appt. Pt verbalized understanding and had no further questions at this time.

## 2025-04-23 NOTE — PROGRESS NOTES
Patient: Josefina Zamora (59 year old, female) Referring Provider:  Insurance:   Diagnosis: Primary osteoarthritis of right knee (M17.11)  Primary osteoarthritis of left knee (M17.12)  Trochanteric bursitis of left hip (M70.62) Raymond Kenney  [employer]   Date of Surgery: N/A Next MD visit:  N/A   Precautions:  None No data recorded Referral Information:    Date of Evaluation: Req: 0, Auth: 0, Exp:     03/26/25 POC Auth Visits:  10       Today's Date   4/23/2025    Subjective  Less of the episodes of sharp intermittent pain. Still difficulty with steps and associated pain       Pain: 4/10     Objective  Tenderness/myofascial restrictions proximal to mid lateral quad. Mild to moderate piriformis tenderness        Assessment  Able to perform step up and down with much less pain for a few steps post soft tissue treatment. Tolerated table exercises well with no increases in pain. Generally felt much better after session. Discussed performing strength exercises to fatigue and longer stretches    Goals (to be met in 10 visits)   Pt will report 50% reduction in both (L) hip & (B) knee pain rated at worst   Pt will be able to perform AROM (L) hip flexion to 110 degrees with minimal to no pain or restriction   Pt will demonstrate at least 4/5 LE strength in order to maximize effectiveness and efficiency with functional activities such as walking, steps, squatting down  Pt will verbalize being able to ascend/descend 6 & 8 inch step with minimal to no (L) hip pain  Pt will verbalize being able to perform ADLs and household chores/tasks with minimal to no pain  Pt will be able to transition from sit to stand and get into and out of car with no pain or restrictions  Pt will be able to participate in most yoga exercises with minimal to no pain   Pt will perform HEP independently in order to sustain changes made with therapy sessions                Plan  Continue PT 2x/week to work on soft tissue restrictions, LE  strengthening, flexibility    Treatment Last 4 Visits  Treatment Day: 5       4/2/2025 4/4/2025 4/9/2025 4/23/2025   LE Treatment   Therapeutic Exercise NuStep x 8 min (L4)   PT assisted (L) ITB stretch in supine 30 sec x 3  H/L HS stretch 30 sec x 3  H/L piriformis stretch 30 sec x 3 (knee to opposite shoulder & push knee down)  H/L ITB stretch 30 sec x 2  H/L clam GTB 2 x 15  Bridge 2 x 10 (5 second hold)   SB calf stretch 30 sec x 3  Standing ITB stretch 30 sec (had difficult time with finding stretch)   NuStep x 8 min (L6)  H/L HS stretch 30 sec x 3  H/L piriformis stretch 30 sec x 3  H/L clam GTB 2 x 15   Bridge 2 x 10 (5 second hold)   SB calf stretch 30 sec x 3  Shuttle (DL) 87# 2 x 15  HEP review  NuStep x 6 min   Prone RF stretch (PT assisted) 1 min x 2  H/L HS stretch 30 sec x 3  H/L piriformis stretch 30 sec x 3  Bridge 2 x 10 (5 second hold)  H/L clam GTB 2 x 15  SB calf stretch 30 sec x 3  Shuttle (DL) 100# 3 x 15 Assessment & re-assessment 6 inch step up and down (post treatment)   H/L HS stretch 30 sec x 3  H/L piriformis stretch 30 sec x 2 (knee down & knee to opposite shoulder)   H/L clam GTB 2 x 20  Bridge w/GTB around knees 2 x 15  Seated clam GTB 2 x 15  Seated lumbar flexion stretch x 10 (5 second hold)    Shuttle (DL) 112# 3 x 15   Manual Therapy STM/massage stick to the (L) mid to proximal lateral quad, TFL/ITB, posterolateral hip musculature  STM/massage stick to lateral (R) quad, TFL/ITB, posterolateral hip musculature  STM/MFR (R) iliacus  STM/massage stick to (L) RF, lateral quad, ITB/TFL, posterior hip musculature  (L) hip flexor release  (L) piriformis release   STM/massage stick to lateral quad/ITB   Therapeutic Exercise Minutes 24 30 25 24   Manual Therapy Minutes 23 13 23 23   Total Time Of Timed Procedures 47 43 48 47   Total Time Of Service-Based Procedures 0 0 0 0   Total Treatment Time 47 43 48 47        HEP  H/L HS stretch 30 sec x 3  H/L piriformis stretch 30 sec x 3 (knee to  shoulder & knee down)  S/L clam 2 x 10     Charges     TherEx 2, Manual 2

## 2025-04-30 NOTE — PROGRESS NOTES
Patient: Josefina Zamora (59 year old, female) Referring Provider:  Insurance:   Diagnosis: Primary osteoarthritis of right knee (M17.11)  Primary osteoarthritis of left knee (M17.12)  Trochanteric bursitis of left hip (M70.62) No ref. provider found  [employer]   Date of Surgery: N/A Next MD visit:  N/A   Precautions:  None No data recorded Referral Information:    Date of Evaluation: Req: 0, Auth: 0, Exp:     03/26/25 POC Auth Visits:  10       Today's Date   4/30/2025    Subjective  Pt reports improvement in symptoms overall. Less intense \"grabbing\" sensations with activity. Step are better but still painful       Pain: 3/10     Objective  Mild to moderate palpable tenderness noted to proximal/lateral quad, ITB, posterolateral hip musculature        Assessment  Pt responds fairly well to manual treatment and exercise intervention with less pain upon end of session. We have focused on more table exercises but starting to transition to more standing activity. Able to tolerated side steps with light resistance with only mild discomfort. Encouraged continued self soft tissue work, strengthening exercises and icing intermittently    Goals (to be met in 10 visits)   Pt will report 50% reduction in both (L) hip & (B) knee pain rated at worst   Pt will be able to perform AROM (L) hip flexion to 110 degrees with minimal to no pain or restriction   Pt will demonstrate at least 4/5 LE strength in order to maximize effectiveness and efficiency with functional activities such as walking, steps, squatting down  Pt will verbalize being able to ascend/descend 6 & 8 inch step with minimal to no (L) hip pain  Pt will verbalize being able to perform ADLs and household chores/tasks with minimal to no pain  Pt will be able to transition from sit to stand and get into and out of car with no pain or restrictions  Pt will be able to participate in most yoga exercises with minimal to no pain   Pt will perform HEP independently  in order to sustain changes made with therapy sessions                  Plan  Continue PT 2x/week for additional 2 weeks to work on improving proximal hip strength & stability    Treatment Last 4 Visits  Treatment Day: 6       4/4/2025 4/9/2025 4/23/2025 4/30/2025   LE Treatment   Therapeutic Exercise NuStep x 8 min (L6)  H/L HS stretch 30 sec x 3  H/L piriformis stretch 30 sec x 3  H/L clam GTB 2 x 15   Bridge 2 x 10 (5 second hold)   SB calf stretch 30 sec x 3  Shuttle (DL) 87# 2 x 15  HEP review  NuStep x 6 min   Prone RF stretch (PT assisted) 1 min x 2  H/L HS stretch 30 sec x 3  H/L piriformis stretch 30 sec x 3  Bridge 2 x 10 (5 second hold)  H/L clam GTB 2 x 15  SB calf stretch 30 sec x 3  Shuttle (DL) 100# 3 x 15 Assessment & re-assessment 6 inch step up and down (post treatment)   H/L HS stretch 30 sec x 3  H/L piriformis stretch 30 sec x 2 (knee down & knee to opposite shoulder)   H/L clam GTB 2 x 20  Bridge w/GTB around knees 2 x 15  Seated clam GTB 2 x 15  Seated lumbar flexion stretch x 10 (5 second hold)    Shuttle (DL) 112# 3 x 15 Upright bike x 5 min (L3)   PPT x 20 ( 5 second hold)   H/L piriformis stretch 30 sec x 3  H/L clam BTB 2 x 15  Bridge w/BTB around knees 2 x 10 (5 second hold)   Seated lumbar flexion x 10 (5 second hold)   DL shuttle 137# 3 x 15  Side step RTB above knees x 2 laps    Manual Therapy STM/massage stick to lateral (R) quad, TFL/ITB, posterolateral hip musculature  STM/MFR (R) iliacus  STM/massage stick to (L) RF, lateral quad, ITB/TFL, posterior hip musculature  (L) hip flexor release  (L) piriformis release   STM/massage stick to lateral quad/ITB STM/MFR (massage stick & massage gun) proximal anterolateral thigh, posterolateral hip musculature    Therapeutic Exercise Minutes 30 25 24 25   Manual Therapy Minutes 13 23 23 23   Total Time Of Timed Procedures 43 48 47 48   Total Time Of Service-Based Procedures 0 0 0 0   Total Treatment Time 43 48 47 48        HEP  H/L HS stretch  30 sec x 3  H/L piriformis stretch 30 sec x 3 (knee to shoulder & knee down)  S/L clam 2 x 10     Charges     TherEx 2, Manual 2

## 2025-05-02 NOTE — PROGRESS NOTES
Patient: Josefina Zamora (59 year old, female) Referring Provider:  Insurance:   Diagnosis: Primary osteoarthritis of right knee (M17.11)  Primary osteoarthritis of left knee (M17.12)  Trochanteric bursitis of left hip (M70.62) No ref. provider found  BCBS POS   Date of Surgery: N/A Next MD visit:  N/A   Precautions:  None No data recorded Referral Information:    Date of Evaluation: Req: 0, Auth: 0, Exp:     03/26/25 POC Auth Visits:  10       Today's Date   5/2/2025    Subjective  Steps is still a challenge with discomfort (has been better since evaluation). Hip does not feel too bad today       Pain: 2/10     Objective  See flowsheet        Assessment  Post soft tissue treatment, patient was able to do side lying clam without pain. Table exercises are going fairly well. Trying to transition to more weightbearing exercises. Attempted step up/down does cause increase in lateral (L) hip pain. Encouraged her to perform gentle side stepping, modified tandem stance exercises at home    Goals (to be met in 10 visits)   Pt will report 50% reduction in both (L) hip & (B) knee pain rated at worst   Pt will be able to perform AROM (L) hip flexion to 110 degrees with minimal to no pain or restriction   Pt will demonstrate at least 4/5 LE strength in order to maximize effectiveness and efficiency with functional activities such as walking, steps, squatting down  Pt will verbalize being able to ascend/descend 6 & 8 inch step with minimal to no (L) hip pain  Pt will verbalize being able to perform ADLs and household chores/tasks with minimal to no pain  Pt will be able to transition from sit to stand and get into and out of car with no pain or restrictions  Pt will be able to participate in most yoga exercises with minimal to no pain   Pt will perform HEP independently in order to sustain changes made with therapy sessions                  Plan  Continue to attempt weight bearing exercise as tolerated    Treatment  Last 4 Visits  Treatment Day: 7       4/9/2025 4/23/2025 4/30/2025 5/2/2025   LE Treatment   Therapeutic Exercise NuStep x 6 min   Prone RF stretch (PT assisted) 1 min x 2  H/L HS stretch 30 sec x 3  H/L piriformis stretch 30 sec x 3  Bridge 2 x 10 (5 second hold)  H/L clam GTB 2 x 15  SB calf stretch 30 sec x 3  Shuttle (DL) 100# 3 x 15 Assessment & re-assessment 6 inch step up and down (post treatment)   H/L HS stretch 30 sec x 3  H/L piriformis stretch 30 sec x 2 (knee down & knee to opposite shoulder)   H/L clam GTB 2 x 20  Bridge w/GTB around knees 2 x 15  Seated clam GTB 2 x 15  Seated lumbar flexion stretch x 10 (5 second hold)    Shuttle (DL) 112# 3 x 15 Upright bike x 5 min (L3)   PPT x 20 ( 5 second hold)   H/L piriformis stretch 30 sec x 3  H/L clam BTB 2 x 15  Bridge w/BTB around knees 2 x 10 (5 second hold)   Seated lumbar flexion x 10 (5 second hold)   DL shuttle 137# 3 x 15  Side step RTB above knees x 2 laps  Upright bike x 6 min (L4)   S/L clam x 10   H/L clam BTB 2 x 15  Bridge w/BTB around knees  2 x 10   Seated piriformis stretch 30 sec x 3  Shuttle (DL) 137# 2 x 15  Shuttle (SL) 87# 2 x 15  Attempt 6 inch FSU/FSD x 4 (painful)  Modified tandem stance 30 sec x 2 (B)     Manual Therapy STM/massage stick to (L) RF, lateral quad, ITB/TFL, posterior hip musculature  (L) hip flexor release  (L) piriformis release   STM/massage stick to lateral quad/ITB STM/MFR (massage stick & massage gun) proximal anterolateral thigh, posterolateral hip musculature  STM/massage stick (L) anterolateral thigh musculature  Massage gun to posterolateral (L) hip musculature    Therapeutic Exercise Minutes 25 24 25 28   Manual Therapy Minutes 23 23 23 14   Total Time Of Timed Procedures 48 47 48 42   Total Time Of Service-Based Procedures 0 0 0 0   Total Treatment Time 48 47 48 42        HEP  H/L HS stretch 30 sec x 3  H/L piriformis stretch 30 sec x 3 (knee to shoulder & knee down)  S/L clam 2 x 10     Charges     TherEx  2, Manual 1

## 2025-05-07 NOTE — PROGRESS NOTES
Patient: Josefina Zamora (59 year old, female) Referring Provider:  Insurance:   Diagnosis: Primary osteoarthritis of right knee (M17.11)  Primary osteoarthritis of left knee (M17.12)  Trochanteric bursitis of left hip (M70.62) No ref. provider found  BC[employer] POS   Date of Surgery: N/A Next MD visit:  N/A   Precautions:  None No data recorded Referral Information:    Date of Evaluation: Req: 0, Auth: 0, Exp:     03/26/25 POC Auth Visits:  10       Today's Date   5/7/2025    Subjective  Feels like hip was doing well and now slightly worse. She is going to follow up with physician       Pain: 3/10     Objective  Moderate tenderness & increased tone to posterolateral hip musculature as well as lateral quad (proximal to mid). Moderate tenderness noted (L) GT          Assessment  Pt was able to perform table exercises without any discomfort. We have attempted to perform step ups/downs and side stepping which increase pain. She has attempted some side stepping at home but hip feels a bit worse today. Discussed holding off on those exercises and only perform ones that make the hip feel better. Encouraged icing and continued prescribed HEP    Goals (to be met in 10 visits)   Pt will report 50% reduction in both (L) hip & (B) knee pain rated at worst   Pt will be able to perform AROM (L) hip flexion to 110 degrees with minimal to no pain or restriction   Pt will demonstrate at least 4/5 LE strength in order to maximize effectiveness and efficiency with functional activities such as walking, steps, squatting down  Pt will verbalize being able to ascend/descend 6 & 8 inch step with minimal to no (L) hip pain  Pt will verbalize being able to perform ADLs and household chores/tasks with minimal to no pain  Pt will be able to transition from sit to stand and get into and out of car with no pain or restrictions  Pt will be able to participate in most yoga exercises with minimal to no pain   Pt will perform HEP  independently in order to sustain changes made with therapy sessions                    Plan  Continue PT x 1 more visit. Discuss potential discharge and follow up with physician as we seem to be reaching a plateau as well as unable to progress to weight bearing/standing activity    Treatment Last 4 Visits  Treatment Day: 8       4/23/2025 4/30/2025 5/2/2025 5/7/2025   LE Treatment   Therapeutic Exercise Assessment & re-assessment 6 inch step up and down (post treatment)   H/L HS stretch 30 sec x 3  H/L piriformis stretch 30 sec x 2 (knee down & knee to opposite shoulder)   H/L clam GTB 2 x 20  Bridge w/GTB around knees 2 x 15  Seated clam GTB 2 x 15  Seated lumbar flexion stretch x 10 (5 second hold)    Shuttle (DL) 112# 3 x 15 Upright bike x 5 min (L3)   PPT x 20 ( 5 second hold)   H/L piriformis stretch 30 sec x 3  H/L clam BTB 2 x 15  Bridge w/BTB around knees 2 x 10 (5 second hold)   Seated lumbar flexion x 10 (5 second hold)   DL shuttle 137# 3 x 15  Side step RTB above knees x 2 laps  Upright bike x 6 min (L4)   S/L clam x 10   H/L clam BTB 2 x 15  Bridge w/BTB around knees  2 x 10   Seated piriformis stretch 30 sec x 3  Shuttle (DL) 137# 2 x 15  Shuttle (SL) 87# 2 x 15  Attempt 6 inch FSU/FSD x 4 (painful)  Modified tandem stance 30 sec x 2 (B)   Upright bike x 6 min (L5)   H/L HS stretch 30 sec x 3  H/L piriformis stretch 30 sec x3  H/L clam BTB 2 x 20  Bridge BTB 2 x 10   LAQ 2# 2 x 15 (5 second hold)   Shuttle (DL) 137# 3 x 15       Manual Therapy (L) hip flexor release  (L) piriformis release   STM/massage stick to lateral quad/ITB STM/MFR (massage stick & massage gun) proximal anterolateral thigh, posterolateral hip musculature  STM/massage stick (L) anterolateral thigh musculature  Massage gun to posterolateral (L) hip musculature  STM (R) piriformis & proximal/lateral quad      Therapeutic Exercise Minutes 24 25 28 20   Manual Therapy Minutes 23 23 14 15   Total Time Of Timed Procedures 47 48 42 35    Total Time Of Service-Based Procedures 0 0 0 0   Total Treatment Time 47 48 42 35        HEP  H/L HS stretch 30 sec x 3  H/L piriformis stretch 30 sec x 3 (knee to shoulder & knee down)  S/L clam 2 x 10     Charges     TherEx 1, Manual 1

## 2025-05-07 NOTE — CONSULTS
Breast Surgery New Patient Consultation    Josefina Zamora is a 59 year old patient, referred by Dr. Phillips, who presents for evaluation of left breast ALH.    History of Present Illness:   Ms. Josefina Zamora is a 59 year old woman with a past history significant for hypertension who presents for evaluation of left breast ALH.     She underwent screening mammogram on 2025.  She has density C breast tissue.  There was a grouping of calcifications in the upper outer quadrant of the left breast.  Diagnostic imaging on 3/28/2025 redemonstrated the calcifications.  Biopsy was recommended and was performed on 4/10/2025, dumbbell clip was placed at the 1 o'clock position. The pathology showed focal atypical lobular hyperplasia with no evidence of malignancy.  This was concordant with the imaging.    She denies any palpable masses, nipple discharge, skin changes, or axillary adenopathy. She does not have breast pain. She does not have significant past history for breast diseases or breast biopsy. She does not have family history of breast cancer. The patient does not have a history of genetic testing.          Past Medical History[1]    Past Surgical History[2]    Gynecological History:  Menarche at age 10 and LMP age 52  Pt is a   Pt was 37 years old at time of first pregnancy.    She has cumulative breastfeeding history of 9 months.  Age of Menopause: 52  Type: natural  She denies any history of hormone replacement therapy  She has history of oral contraceptive use in the past.   She has recieved infertility treatment to achieve pregnancy.    Medications:    Current Medications[3]    Allergies:    Allergies[4]    Family History:   Family History[5]    She is not of Ashkenazi Christianity ancestry.    Social History:  History   Alcohol Use    Yes       History   Smoking Status    Former    Types: Cigarettes   Smokeless Tobacco    Not on file       Review of Systems:    Review of Systems    Constitutional:  Negative for chills and fever.   HENT:  Negative for sore throat and trouble swallowing.    Eyes:  Negative for visual disturbance.   Respiratory:  Negative for cough, chest tightness and shortness of breath.    Cardiovascular:  Negative for chest pain, palpitations and leg swelling.   Gastrointestinal:  Negative for nausea, vomiting, abdominal pain, diarrhea, constipation and blood in stool.   Genitourinary:  Negative for dysuria, hematuria and difficulty urinating.   Skin:  Negative for rash.   Neurological:  Negative for numbness and headaches.      Otherwise as per HPI.    Physical Exam:    /85   Pulse 91   Wt 71.7 kg (158 lb)   SpO2 98%   BMI 27.12 kg/m²     Physical Exam  Vitals reviewed.   Constitutional:       Appearance: Normal appearance.   HENT:      Head: Normocephalic and atraumatic.   Eyes:      Extraocular Movements: Extraocular movements intact.      Pupils: Pupils are equal, round, and reactive to light.   Cardiovascular:      Rate and Rhythm: Normal rate.   Pulmonary:      Effort: Pulmonary effort is normal.   Chest:   Breasts:     Right: Normal. No mass, nipple discharge, skin change or tenderness.      Left: Normal. No mass, nipple discharge, skin change or tenderness.       Abdominal:      General: Abdomen is flat.      Palpations: Abdomen is soft.   Musculoskeletal:         General: Normal range of motion.      Cervical back: Normal range of motion and neck supple.   Lymphadenopathy:      Upper Body:      Right upper body: No supraclavicular or axillary adenopathy.      Left upper body: No supraclavicular or axillary adenopathy.   Skin:     General: Skin is warm and dry.   Neurological:      General: No focal deficit present.      Mental Status: She is alert and oriented to person, place, and time.   Psychiatric:         Mood and Affect: Mood normal.          Bra size: 38DD (XL)    Labs/imaging: reviewed in EPIC    Impression:   Ms. Josefina Moran Courtneykillian Noah is a  59 year old woman that presents for left breast ALH    Discussion and Plan:  I had a discussion with the Patient regarding her breast exam. On exam today I found no evidence of disease. I personally reviewed her recent imaging and and we discussed this.    We discussed atypical lesions.  Excision is not always mandatory if pure ALH, and patients can be managed as high risk.  I discussed that data shows ALH has a 0 to 6% upstage rate.  I offered this patient both high risk management without surgery and the option of surgery. Observation would consist of physical exam with MRI and/or mammogram every 6 months for 1 year to assess for changes. I discussed that either way the patient would be referred for discussion of chemoprophylaxis. At this time I do recommend observation.     Atypical hyperplasia has an absolute risk of 1-2% per year risk of breast cancer. We will continue with a annual screening mammogram. We discussed MRI screening due to high risk of breast cancer and patient agrees. Clinical exam will be performed annually by myself and another provider (2 breast exams per year). We discussed risk reduction chemoprophylaxis and the patient will follow-up with medical oncology.      Further imaging:   Mammogram: Left breast diagnostic mammogram ordered for August 2025  Bilateral mammogram will be due February 2026, will order later    MRI: Bilateral breast MRI ordered for September 2025 for high risk screening in the setting of biopsy-proven ALH of the left breast    Return to clinic: 6 months    She was given ample opportunity for questions and those questions were answered to her satisfaction. She has been encouraged to contact the office with any questions or concerns. This encounter lasted a total of 55 minutes, more than 50% of which was dedicated to the discussion of management options.     Deidra Stern MD  Breast Surgical Oncology    CC: Dr. Phillips          [1]   Past Medical History:   Essential  hypertension   [2]   Past Surgical History:  Procedure Laterality Date    Cholecystectomy  2010    Colonoscopy      D & c      Miscarried      2002    Other surgical history  1966    Corrective eye surgery   [3]    amLODIPine 2.5 MG Oral Tab Take 2 tablets (5 mg total) by mouth daily. 180 tablet 3   [4]   Allergies  Allergen Reactions    Cefadroxil HIVES and RASH    Heparin RASH   [5]   Family History  Problem Relation Age of Onset    Hypertension Father

## 2025-05-07 NOTE — PATIENT INSTRUCTIONS
Mammogram ordered of left side for August 2025  Breast MRI ordered for September 2025  Return to clinic in 6 months  Referral to medical oncology for discussion of chemoprophylaxis (taking medicine to block hormones and decrease risk of breast cancer)

## 2025-05-09 NOTE — PROGRESS NOTES
Patient: Josefina Zamora (59 year old, female) Referring Provider:  Insurance:   Diagnosis: Primary osteoarthritis of right knee (M17.11)  Primary osteoarthritis of left knee (M17.12)  Trochanteric bursitis of left hip (M70.62) No ref. provider found  BCBS POS   Date of Surgery: N/A Next MD visit:  N/A   Precautions:  None No data recorded Referral Information:    Date of Evaluation: Req: 0, Auth: 0, Exp:     03/26/25 POC Auth Visits:  10       Today's Date   5/9/2025    Subjective  Feels much better today than the other day. Was able to descend the stairs the other day without any pain which was surprising       Pain: 1/10     Objective  See flowsheet        Assessment  Hypertonicity noted adductors, lateral quad, posterolateral gluteal musculature. Less tenderness and tightness post soft tissue treatment. Tolerating table exercises well however having difficulty with weightbearing activity. Any exercise that requires shifting weight on (L) leg causes increased pain. Difficult progressing    Goals (to be met in 10 visits)   Pt will report 50% reduction in both (L) hip & (B) knee pain rated at worst   Pt will be able to perform AROM (L) hip flexion to 110 degrees with minimal to no pain or restriction   Pt will demonstrate at least 4/5 LE strength in order to maximize effectiveness and efficiency with functional activities such as walking, steps, squatting down  Pt will verbalize being able to ascend/descend 6 & 8 inch step with minimal to no (L) hip pain  Pt will verbalize being able to perform ADLs and household chores/tasks with minimal to no pain  Pt will be able to transition from sit to stand and get into and out of car with no pain or restrictions  Pt will be able to participate in most yoga exercises with minimal to no pain   Pt will perform HEP independently in order to sustain changes made with therapy sessions                      Plan  Last session next week, discharge with HEP, follow up  with physician    Treatment Last 4 Visits  Treatment Day: 9 4/30/2025 5/2/2025 5/7/2025 5/9/2025   LE Treatment   Therapeutic Exercise Upright bike x 5 min (L3)   PPT x 20 ( 5 second hold)   H/L piriformis stretch 30 sec x 3  H/L clam BTB 2 x 15  Bridge w/BTB around knees 2 x 10 (5 second hold)   Seated lumbar flexion x 10 (5 second hold)   DL shuttle 137# 3 x 15  Side step RTB above knees x 2 laps  Upright bike x 6 min (L4)   S/L clam x 10   H/L clam BTB 2 x 15  Bridge w/BTB around knees  2 x 10   Seated piriformis stretch 30 sec x 3  Shuttle (DL) 137# 2 x 15  Shuttle (SL) 87# 2 x 15  Attempt 6 inch FSU/FSD x 4 (painful)  Modified tandem stance 30 sec x 2 (B)   Upright bike x 6 min (L5)   H/L HS stretch 30 sec x 3  H/L piriformis stretch 30 sec x3  H/L clam BTB 2 x 20  Bridge BTB 2 x 10   LAQ 2# 2 x 15 (5 second hold)   Shuttle (DL) 137# 3 x 15     Upright bike x 8 min (L5)  H/L clam BTB 2 x 20  S/L clam (painful)  PPT 2 x 10 (5 second hold)   Bridge w/BTB around knees 2 x 10   HS stretch 30 sec x 3  Seated piriformis stretch 30 sec x 3  Sit to stand x 20  Shuttle (DL) 150# 3 x 15   Shuttle (SL) 75# x 20     Manual Therapy STM/MFR (massage stick & massage gun) proximal anterolateral thigh, posterolateral hip musculature  STM/massage stick (L) anterolateral thigh musculature  Massage gun to posterolateral (L) hip musculature  STM (R) piriformis & proximal/lateral quad    STM/MFR (L) hip flexor, adductors, lateral quad    Therapeutic Exercise Minutes 25 28 20 30   Manual Therapy Minutes 23 14 15 12   Total Time Of Timed Procedures 48 42 35 42   Total Time Of Service-Based Procedures 0 0 0 0   Total Treatment Time 48 42 35 42        HEP  H/L HS stretch 30 sec x 3  H/L piriformis stretch 30 sec x 3 (knee to shoulder & knee down)  S/L clam 2 x 10     Charges     TherEx 2, Manual 1

## 2025-05-14 NOTE — TELEPHONE ENCOUNTER
Voicemail left for patient to call back to schedule with medical oncology for chemoprevention as referred by Dr. Stern. Direct number provided for patient to call back to schedule.

## 2025-05-14 NOTE — PROGRESS NOTES
Patient: Josefina Zamora (59 year old, female) Referring Provider:  Insurance:   Diagnosis: Primary osteoarthritis of right knee (M17.11)  Primary osteoarthritis of left knee (M17.12)  Trochanteric bursitis of left hip (M70.62) No ref. provider found  BCBS POS   Date of Surgery: N/A Next MD visit:  N/A   Precautions:  None No data recorded Referral Information:    Date of Evaluation: Req: 0, Auth: 0, Exp:     03/26/25 POC Auth Visits:  10       Today's Date   5/14/2025     Discharge Summary  Pt has attended 10 visits in Physical Therapy.        Subjective  Ready to go on a trip has been up and stairs alot which causes pain.       Pain: 2/10     Objective  AROM (R) hip WNL in all planes (mild pain with hip flexion, ER, IR)  Strength (MMT) - (L) hip flexion 4/5, (L) hip ER 4/5, (L) hip abduction 4-/5 (painful), (L) hip IR 4/5, (L) hip extension 4-/5  Flexibility - Mild (B) HS tightness, Mild to moderate (L) piriformis tightness, Mild to moderate quad tightness, Mild (L) hip flexor guarding.   Function - ambulation with non-antalgic gait pattern, slight increased internally rotated hip/leg, able to ascend/descend step with decreased motor control, strength, and pain noted        Assessment  Pt with mild to moderate improvement at times with sessions. Carryover has not been ideal outside of therapy as she continues to have pain in the hip. Steps and overall single leg stance activity seem to increased lateral hip pain. She tolerated table exercises well but difficult to progress to upright/weight bearing exercises.    Goals (to be met in 10 visits)   Pt will report 50% reduction in both (L) hip & (B) knee pain rated at worst - not met (in progress)  Pt will be able to perform AROM (L) hip flexion to 110 degrees with minimal to no pain or restriction - partially met  Pt will demonstrate at least 4/5 LE strength in order to maximize effectiveness and efficiency with functional activities such as walking,  steps, squatting down - not met (in progress)  Pt will verbalize being able to ascend/descend 6 & 8 inch step with minimal to no (L) hip pain - not met  Pt will verbalize being able to perform ADLs and household chores/tasks with minimal to no pain - partially met  Pt will be able to transition from sit to stand and get into and out of car with no pain or restrictions - partially met  Pt will be able to participate in most yoga exercises with minimal to no pain - not met (in progress)  Pt will perform HEP independently in order to sustain changes made with therapy sessions - met         Plan  Follow up with referring physician. Discharge with HEP    LEFS Score  LEFS Score: (Patient-Rptd) 71.25 % (3/23/2025  7:17 PM)    Post LEFS Score  Post LEFS Score: (Patient-Rptd) 71.25 % (5/14/2025 12:37 PM)    0 % improvement        Treatment Last 4 Visits  Treatment Day: 10       5/2/2025 5/7/2025 5/9/2025 5/14/2025   LE Treatment   Therapeutic Exercise Upright bike x 6 min (L4)   S/L clam x 10   H/L clam BTB 2 x 15  Bridge w/BTB around knees  2 x 10   Seated piriformis stretch 30 sec x 3  Shuttle (DL) 137# 2 x 15  Shuttle (SL) 87# 2 x 15  Attempt 6 inch FSU/FSD x 4 (painful)  Modified tandem stance 30 sec x 2 (B)   Upright bike x 6 min (L5)   H/L HS stretch 30 sec x 3  H/L piriformis stretch 30 sec x3  H/L clam BTB 2 x 20  Bridge BTB 2 x 10   LAQ 2# 2 x 15 (5 second hold)   Shuttle (DL) 137# 3 x 15     Upright bike x 8 min (L5)  H/L clam BTB 2 x 20  S/L clam (painful)  PPT 2 x 10 (5 second hold)   Bridge w/BTB around knees 2 x 10   HS stretch 30 sec x 3  Seated piriformis stretch 30 sec x 3  Sit to stand x 20  Shuttle (DL) 150# 3 x 15   Shuttle (SL) 75# x 20   HEP review, updated, handout provided   H/L clam BTB 2 x 20  Bridge w/BTB 2 x 10   PPT x 15 (5 second hold)   Attempted side lying hip abduction (painful)       Manual Therapy STM/massage stick (L) anterolateral thigh musculature  Massage gun to posterolateral (L) hip  musculature  STM (R) piriformis & proximal/lateral quad    STM/MFR (L) hip flexor, adductors, lateral quad  STM/MFR (L) hip flexor (iliacus)   STM/MFR (L) piriformis    Therapeutic Exercise Minutes 28 20 30 15   Manual Therapy Minutes 14 15 12 15   Total Time Of Timed Procedures 42 35 42 30   Total Time Of Service-Based Procedures 0 0 0 0   Total Treatment Time 42 35 42 30               HEP  Access Code: [email or web address]  URL: [email or web address]  Date: 05/14/2025  Prepared by: Alan Robertson    Exercises  - Hooklying Clamshell with Resistance  - 2 x daily - 7 x weekly - 2 sets - 15 reps  - Clamshell  - 1-2 x daily - 7 x weekly - 3 sets - 10 reps  - Supine Bridge with Resistance Band  - 2 x daily - 7 x weekly - 2 sets - 10 reps - 5 second hold  - Supine Posterior Pelvic Tilt  - 2 x daily - 7 x weekly - 2 sets - 10 reps - 5 second hold  - Supine Piriformis Stretch with Foot on Ground  - 2 x daily - 7 x weekly - 3 sets - 30 seconds hold  - Supine Hamstring Stretch  - 2 x daily - 7 x weekly - 3 sets - 30 seconds hold    Charges     TherEx 1, Manual 1

## 2025-05-19 NOTE — TELEPHONE ENCOUNTER
Called patient back regarding her VM about needing assistance with scheduling medical oncologist for chemoprevention, as recommended by Dr. Stern. Scheduled patient at the [facility] with Dr. Coats on Thursday July 24, 2025 at 1300. Provided the breast RN navigators contact information for further assistance.

## 2025-07-24 NOTE — CONSULTS
[facility] Report of Consultation    Patient Name: Josefina Zamora   YOB: 1965   Medical Record Number: [id number]   CSN: [id number]   Consulting Physician: Lalit Coats MD  Referring Physician(s): No ref. provider found  Date of Consultation: 7/24/2025     Reason for Consultation:  Josefina Zam[facility] for evaluation and management of atypical lobular hyperplasia of the left breast.    History of Present Illness  Josefina Zamora is a 60 year old female who presents for follow-up after a biopsy showing atypical lobular hyperplasia. She was referred by Dr. Stenr for further evaluation.    She underwent a routine screening mammogram on February 21, 2025, which revealed a group of calcifications in the upper outer left breast. Additional compression views on March 28, 2025, confirmed these calcifications, leading to a recommendation for a biopsy. A biopsy performed on April 10, 2025, showed atypical lobular hyperplasia, usual duct hyperplasia, sclerosing adenosis, and stromal fibrosis with microcalcifications. No evidence of malignancy was found.    Her physical health has been stable over the past year with no new pains, breathing problems, abdominal issues, bowel issues, or urinary issues. She is on blood pressure medication.    Her past medical history includes corrective eye surgery and a D&C in 2001 after a miscarriage. She has experienced several miscarriages, with the first occurring at twelve weeks. She has no personal history of venous thrombosis.    Her family history is significant for a grandfather with colon cancer on her mother's side and her mother having experienced blood clots in recent years.    She has a history of smoking but quit around the year 2000. She consumes alcohol regularly, typically a daily cocktail.    Past Medical History:  Past Medical History[1]    Past Surgical History:  Past Surgical  History[2]    Family Medical History:  Family History[3]    Gyne History:  OB History    Para Term  AB Living   1 1 0 0 0 0   SAB IAB Ectopic Multiple Live Births   0 0 0 0 0   Obstetric Comments   Menarche at age 10   Age of first pregnancy 37   Menopause at about 52       Psychosocial History:  Social History     Socioeconomic History    Marital status:      Spouse name: Not on file    Number of children: 1    Years of education: Not on file    Highest education level: Not on file   Occupational History    Not on file   Tobacco Use    Smoking status: Former     Current packs/day: 0.00     Average packs/day: 0.5 packs/day for 15.0 years (7.5 ttl pk-yrs)     Types: Cigarettes     Quit date: 2000     Years since quittin.5    Smokeless tobacco: Not on file   Vaping Use    Vaping status: Never Used   Substance and Sexual Activity    Alcohol use: Yes    Drug use: Never    Sexual activity: Not on file   Other Topics Concern    Caffeine Concern No    Exercise No    Seat Belt No    Special Diet No    Stress Concern No    Weight Concern Yes     Comment: Would like to loose weight   Social History Narrative    Not on file     Social Drivers of Health     Food Insecurity: No Food Insecurity (2025)    NCSS - Food Insecurity     Worried About Running Out of Food in the Last Year: No     Ran Out of Food in the Last Year: No   Transportation Needs: No Transportation Needs (2025)    NCSS - Transportation     Lack of Transportation: No   Housing Stability: Not At Risk (2025)    NCSS - Housing/Utilities     Has Housing: Yes     Worried About Losing Housing: No     Unable to Get Utilities: No       Allergies:   Allergies[4]    Current Medications:  Medications - Current[5]    Review of Systems:      Constitutional: Negative for anorexia, fatigue, fevers, chills, night sweats and weight loss.  Eyes: Negative for visual disturbance, irritation and redness.  Respiratory: Negative for cough,  hemoptysis, chest pain, or dyspnea.  Cardiovascular: Negative for angina, orthopnea or palpitations.  Gastrointestinal: Negative for nausea, vomiting, change in bowel habits, diarrhea, constipation and abdominal pain.  Integument/breast: Negative for rash, skin lesions, and pruritus.  Hematologic/lymphatic: Negative for easy bruising, bleeding, and lymphadenopathy.  Musculoskeletal: Negative for myalgias, arthralgias, muscle weakness.  Genitourinary: Negative for dysuria or hematuria  Neurological: Negative for headaches, dizziness, speech problems, gait problems and focal weakness.  Psychiatric: The patient's mood was calm and appropriate for this visit.    The pertinent positives and negatives were described in the HPI and above. All other systems were negative.      Vital Signs:  Height: 160 cm (5' 3\") (07/24 1254)  Weight: 71 kg (156 lb 8 oz) (07/24 1254)  BSA (Calculated - sq m): 1.74 sq meters (07/24 1254)  Pulse: 74 (07/24 1254)  BP: 154/92 (07/24 1254)  Temp: 98.6 °F (37 °C) (07/24 1254)  Do Not Use - Resp Rate: --  SpO2: 97 % (07/24 1254)    Physical Examination:    Constitutional: Patient is alert and oriented x 3, not in acute distress.  HEENT:  Oropharynx is clear. Neck is supple.  Eyes: Anicteric sclera. Pink conjunctiva.  Respiratory: Clear to auscultation and percussion. No rales.  No wheezes.  Cardiovascular: Regular rate and rhythm.   Gastrointestinal: Soft, non tender with good bowel sounds.  Extremities: No edema. No calf tenderness.  Lymphatics: There is no palpable lymphadenopathy throughout in the cervical, supraclavicular, or axillary regions.    Labs reviewed at this visit:  No results found for: \"WBC\", \"RBC\", \"HGB\", \"HCT\", \"MCV\", \"MCH\", \"MCHC\", \"RDW\", \"PLT\", \"MPV\"  No results found for: \"NA\", \"K\", \"CL\", \"CO2\", \"VHCO3\", \"BUN\", \"CREATSERUM\", \"GLU\", \"CA\", \"ALKPHO\", \"ALT\", \"AST\", \"BILT\", \"ALB\", \"TP\"    Results  RADIOLOGY  Mammogram: Group of calcifications in the upper outer left breast  (02/21/2025)  Compression views: Group of calcifications in the left breast (03/28/2025)    PATHOLOGY  Breast biopsy: Focal area of atypical lobular hyperplasia, usual ductal hyperplasia, sclerosing adenosis, stromal fibrosis with microcalcifications, no evidence of malignancy (04/10/2025)     Radiologic imaging reviewed at this visit:    Mammogram on 3/28/2025:  FINDINGS:    Grouping of calcifications within the 1 o'clock position of the left breast which stereotactic biopsy is recommended to further evaluate.     The findings from the diagnostic imaging and recommendation of stereotactic biopsy of the left breast were discussed with the patient. I also discussed the risks/benefits of the procedure and she was amenable to proceed with the biopsy.     Impression   CONCLUSION:    Grouping of calcifications within the left breast which stereotactic biopsy is recommended.     BI-RADS CATEGORY:    DIAGNOSTIC [id number] - SUSPICIOUS (MODERATE SUSPICION FOR MALIGNANCY)       RECOMMENDATIONS:    STEREOTACTIC BREAST BIOPSY: LEFT BREAST           Assessment & Plan  Atypical lobular hyperplasia  Atypical lobular hyperplasia identified in the left breast following biopsy with no evidence of malignancy. Increased risk of developing breast cancer in both breasts, approximately three to fourfold higher than average.  - Continue regular screening with mammograms and MRI as recommended by Dr. Stern.  - Consider raloxifene for breast cancer risk reduction due to its balance of benefits and risks, particularly in postmenopausal women.  - Discussed medication options, including tamoxifen, raloxifene, and anastrozole:  I discussed benefits and risks of these options. I discussed the risk of thrombosis with tamoxifen and raloxifene. I discussed the risk of uterine cancer. I discussed the risk of arthralgias and decreased bone density on anastrozole. The patient will discuss these options with her family and decide on a plan. She said  she will contact me with a decision.    Multiple miscarriages  Multiple miscarriages, including one late miscarriage at 12 weeks. Previous testing for lupus anticoagulant and treatment with heparin, which caused an allergic reaction. I favor breast cancer prevention with either raloxifene or anastrozole and avoiding tamoxifen.    Hypertension  Hypertension managed with medication. She is working towards weight loss to potentially discontinue blood pressure medication.         The following individual(s) verbally consented to be recorded using ambient AI listening technology and understand that they can each withdraw their consent to this listening technology at any point by asking the clinician to turn off or pause the recording:    Patient name: Josefina Ruggiero Noah  Additional names:  Gabino Coats MD        [1]   Past Medical History:   Essential hypertension   [2]   Past Surgical History:  Procedure Laterality Date    Colonoscopy      D & c      Miscarried      2002    Other surgical history  1966    Corrective eye surgery   [3]   Family History  Problem Relation Age of Onset    Hypertension Father     Colon Cancer Maternal Grandfather    [4]   Allergies  Allergen Reactions    Cefadroxil HIVES and RASH    Heparin RASH   [5]   Current Outpatient Medications:     amLODIPine 2.5 MG Oral Tab, Take 2 tablets (5 mg total) by mouth daily., Disp: 180 tablet, Rfl: 3

## 2025-07-24 NOTE — PROGRESS NOTES
Patient here for new consult for left breast ALH. Patient completed left breast biopsy on 4/10. Patient had consultation with Dr Stern on 5/7. Patient is accompanied by her  Jhony.     Education Record    Learner:  Patient and Spouse  Jhony    Disease / Diagnosis: new consult     Barriers / Limitations:  None   Comments:    Method:  Discussion   Comments:    General Topics:  Medication, Side effects and symptom management, and Plan of care reviewed   Comments:    Outcome:  Shows understanding   Comments:

## (undated) NOTE — Clinical Note
Thank you for allowing me to care for your patient! I will follow her with close surveillance as she is high risk for breast cancer. No need to undergo surgery at this time, I will see her in 6 months and have ordered her follow up imaging. Thanks, Deidra Stern